# Patient Record
Sex: MALE
[De-identification: names, ages, dates, MRNs, and addresses within clinical notes are randomized per-mention and may not be internally consistent; named-entity substitution may affect disease eponyms.]

---

## 2024-08-14 PROBLEM — Z00.00 ENCOUNTER FOR PREVENTIVE HEALTH EXAMINATION: Status: ACTIVE | Noted: 2024-08-14

## 2024-09-09 ENCOUNTER — APPOINTMENT (OUTPATIENT)
Dept: SURGERY | Facility: CLINIC | Age: 59
End: 2024-09-09

## 2024-09-09 VITALS
SYSTOLIC BLOOD PRESSURE: 116 MMHG | HEART RATE: 69 BPM | HEIGHT: 68 IN | DIASTOLIC BLOOD PRESSURE: 74 MMHG | BODY MASS INDEX: 33.34 KG/M2 | TEMPERATURE: 97 F | OXYGEN SATURATION: 97 % | WEIGHT: 220 LBS

## 2024-09-09 DIAGNOSIS — Z78.9 OTHER SPECIFIED HEALTH STATUS: ICD-10-CM

## 2024-09-09 DIAGNOSIS — I10 ESSENTIAL (PRIMARY) HYPERTENSION: ICD-10-CM

## 2024-09-09 PROCEDURE — 99204 OFFICE O/P NEW MOD 45 MIN: CPT

## 2024-09-09 RX ORDER — ASPIRIN 81 MG/1
81 TABLET, CHEWABLE ORAL
Refills: 0 | Status: ACTIVE | COMMUNITY

## 2024-09-09 RX ORDER — TURMERIC ROOT EXTRACT 500 MG
500 TABLET ORAL
Refills: 0 | Status: ACTIVE | COMMUNITY

## 2024-09-09 RX ORDER — CHROMIUM 200 MCG
TABLET ORAL
Refills: 0 | Status: ACTIVE | COMMUNITY

## 2024-09-09 NOTE — ASSESSMENT
[FreeTextEntry1] : Assessment and Plan: - Umbilical Hernia and Diastasis Recti : The patient has an umbilical hernia and diastasis recti (separation of abdominal muscles) that is causing discomfort when lying on the stomach. The diastasis recti may also be contributing to lower back discomfort. - Therapeutic Interventions: Surgical repair of the umbilical hernia and diastasis recti is recommended. The procedure involves laparoscopic suturing of the  muscles and placement of mesh to reinforce the repair. The surgery is scheduled for early October.  - Diagnostic Tests: Recent bloodwork and notes from the patient's cardiologist and primary care physician will be obtained for pre-operative evaluation.  - Patient Education: The patient has been informed about the surgical procedure, recovery process, and expected timeline for return to normal activities.  - Follow-Up: The patient will be scheduled for a post-operative follow-up appointment approximately 4-6 weeks after the surgery.

## 2024-09-09 NOTE — HISTORY OF PRESENT ILLNESS
[de-identified] : - Summary : The patient is here today for evaluation of an umbilical hernia and diastasis recti (separation of abdominal muscles). - Chief Complaint (CC) : Protruding umbilical hernia and discomfort when lying on stomach. - History of Present Illness : The patient is a 58-year-old male who has had an umbilical hernia for a long time. He is very athletic, playing and coaching baseball. Recently, he has noticed discomfort when lying on his stomach, with a feeling of something trying to push out, but no pain. His primary care physician recommended a consultation for the hernia. - Past Medical History : The patient has a history of bicep surgery, potential cholesterol issues, and is currently taking aspirin as a preventative measure. - Past Surgical History : The patient has undergone bicep surgery in the past. - Family History : - Social History : - Athletic, playing and coaching baseball  - Occupation: Coaching a travel baseball team  - Review of Systems : Musculoskeletal: Lower back discomfort, especially with strenuous activity without stretching. - Medications : - Aspirin (preventative)  - Lisinopril (blood pressure medication)  - Allergies : - Penicillin (rash)  - Shrimp (hives)  Objective: - Diagnostic Results : - Vital Signs : Blood pressure 116/74, Heart rate 76 - Physical Examination (PE) : The patient has a protruding umbilical hernia and diastasis recti (separation of abdominal muscles) extending from the umbilical region down towards the lower abdomen.

## 2024-09-09 NOTE — PHYSICAL EXAM
[de-identified] : nad [de-identified] : ncat [de-identified] : slow unlabored breathing [de-identified] : sumayar [de-identified] : s, nt, nd. 3-4cm umbilical hernia with large upper and lower diastasis with possible associated small defects [de-identified] : normal gait and balance